# Patient Record
Sex: FEMALE | NOT HISPANIC OR LATINO | URBAN - METROPOLITAN AREA
[De-identification: names, ages, dates, MRNs, and addresses within clinical notes are randomized per-mention and may not be internally consistent; named-entity substitution may affect disease eponyms.]

---

## 2018-10-29 ENCOUNTER — OFFICE VISIT (OUTPATIENT)
Dept: URBAN - METROPOLITAN AREA CLINIC 32 | Facility: CLINIC | Age: 83
End: 2018-10-29
Payer: MEDICARE

## 2018-10-29 DIAGNOSIS — H35.3212 EXDTVE AGE-REL MCLR DEGN, RIGHT EYE, WITH INACT CHRDL NEOVAS: ICD-10-CM

## 2018-10-29 PROCEDURE — 92134 CPTRZ OPH DX IMG PST SGM RTA: CPT | Performed by: OPHTHALMOLOGY

## 2018-10-29 PROCEDURE — 92004 COMPRE OPH EXAM NEW PT 1/>: CPT | Performed by: OPHTHALMOLOGY

## 2018-10-29 ASSESSMENT — INTRAOCULAR PRESSURE
OS: 12
OD: 12

## 2018-10-29 NOTE — IMPRESSION/PLAN
Impression: Exdtve age-rel mclr degn, left eye, with actv chrdl neovas: H35.3221. OS. Condition: stable. Hx of ZAHRAA tx w/ Avastin OS x 3 last tx early 10/2018 w/Dr Martita Perea Plan: Patient here for a second opinion. Asked if there is surgery or laser treatment for her condition. Discussed diagnosis in detail with patient and daughter Austin Alberts. No treatment is required at this time based on exam and OCT. Surgery and/or laser treatment is not recommended for her condition. Recommend observation for now. OCT shows no IRF or SRF - stable OS. Recommend to continue taking the eye vitamins - AREDS 2 formula,  monitor vision at home with [employer], wear quality sunglasses and discontinue smoking. Keep future appointments with your retina specialist. Continue taking the glaucoma meds as recommended by your doctor (Latanoprost OU QHS and Alphagan P OU BID).

## 2018-10-29 NOTE — IMPRESSION/PLAN
Impression: Exdtve age-rel mclr degn, right eye, with inact chrdl neovas: H35.3212. OD. Condition: stable. Vision: vision affected. Hx of multiple ZAHRAA tx's OD with Avastin w/Dr Jareth Hodgson Plan: Discussed diagnosis in detail with patient. No treatment is required at this time based on exam and OCT. Recommend observation for now. Recommend to continue future appts w/Dr Jareth Hodgson. OCT shows thinning with no fluid - stable OD.

## 2020-01-01 ENCOUNTER — OFFICE VISIT (OUTPATIENT)
Dept: URBAN - METROPOLITAN AREA CLINIC 32 | Facility: CLINIC | Age: 85
End: 2020-01-01
Payer: MEDICARE

## 2020-01-01 DIAGNOSIS — H40.1133 PRIMARY OPEN-ANGLE GLAUCOMA, BILATERAL, SEVERE STAGE: ICD-10-CM

## 2020-01-01 DIAGNOSIS — H35.3221 EXDTVE AGE-REL MCLR DEGN, LEFT EYE, WITH ACTV CHRDL NEOVAS: ICD-10-CM

## 2020-01-01 PROCEDURE — 99204 OFFICE O/P NEW MOD 45 MIN: CPT | Performed by: OPTOMETRIST

## 2020-01-01 RX ORDER — LATANOPROST 50 UG/ML
0.005 % SOLUTION OPHTHALMIC
Qty: 10 | Refills: 9 | Status: ACTIVE
Start: 2020-01-01

## 2020-01-01 RX ORDER — BRIMONIDINE TARTRATE 1 MG/ML
0.1 % SOLUTION/ DROPS OPHTHALMIC
Qty: 10 | Refills: 9 | Status: ACTIVE
Start: 2020-01-01

## 2020-01-01 ASSESSMENT — INTRAOCULAR PRESSURE
OS: 12
OD: 12

## 2020-01-15 NOTE — IMPRESSION/PLAN
Impression: Exdtve age-rel mclr degn, right eye, with inact chrdl neovas: H35.3212. OD. Condition: stable. Vision: vision affected. Hx of multiple ZAHRAA tx's OD with Avastin w/Dr Jose Enriquez Plan: Discussed diagnosis in detail with patient. No treatment is required at this time based on exam and OCT. Recommend observation for now. Recommend to continue future appts w/Dr Jose Enriquez. OCT shows thinning with no fluid - stable OD.

## 2020-01-15 NOTE — IMPRESSION/PLAN
Impression: Primary open-angle glaucoma, bilateral, severe stage: Q21.8204.  Plan: continue alphagan P BID OU Latanoprost QHS OU RTC 3 months tA MIGUEL FAYE